# Patient Record
Sex: FEMALE | NOT HISPANIC OR LATINO | Employment: FULL TIME | URBAN - METROPOLITAN AREA
[De-identification: names, ages, dates, MRNs, and addresses within clinical notes are randomized per-mention and may not be internally consistent; named-entity substitution may affect disease eponyms.]

---

## 2021-07-08 ENCOUNTER — HOSPITAL ENCOUNTER (EMERGENCY)
Facility: HOSPITAL | Age: 21
Discharge: HOME | End: 2021-07-09
Admitting: EMERGENCY MEDICINE

## 2021-07-08 DIAGNOSIS — O21.0 HYPEREMESIS OF PREGNANCY: Primary | ICD-10-CM

## 2021-07-08 DIAGNOSIS — Z3A.01 LESS THAN 8 WEEKS GESTATION OF PREGNANCY: ICD-10-CM

## 2021-07-08 LAB
ALBUMIN SERPL-MCNC: 4.2 G/DL (ref 3.5–5.2)
ALBUMIN/GLOB SERPL: 1.8 G/DL
ALP SERPL-CCNC: 56 U/L (ref 39–117)
ALT SERPL W P-5'-P-CCNC: 11 U/L (ref 1–33)
ANION GAP SERPL CALCULATED.3IONS-SCNC: 9.8 MMOL/L (ref 5–15)
AST SERPL-CCNC: 11 U/L (ref 1–32)
BASOPHILS # BLD AUTO: 0.04 10*3/MM3 (ref 0–0.2)
BASOPHILS NFR BLD AUTO: 0.3 % (ref 0–1.5)
BILIRUB SERPL-MCNC: 0.2 MG/DL (ref 0–1.2)
BILIRUB UR QL STRIP: NEGATIVE
BUN SERPL-MCNC: 11 MG/DL (ref 6–20)
BUN/CREAT SERPL: 11.1 (ref 7–25)
CALCIUM SPEC-SCNC: 9.1 MG/DL (ref 8.6–10.5)
CHLORIDE SERPL-SCNC: 105 MMOL/L (ref 98–107)
CLARITY UR: CLEAR
CO2 SERPL-SCNC: 23.2 MMOL/L (ref 22–29)
COLOR UR: YELLOW
CREAT SERPL-MCNC: 0.99 MG/DL (ref 0.57–1)
DEPRECATED RDW RBC AUTO: 39.8 FL (ref 37–54)
EOSINOPHIL # BLD AUTO: 0.26 10*3/MM3 (ref 0–0.4)
EOSINOPHIL NFR BLD AUTO: 2 % (ref 0.3–6.2)
ERYTHROCYTE [DISTWIDTH] IN BLOOD BY AUTOMATED COUNT: 11.9 % (ref 12.3–15.4)
GFR SERPL CREATININE-BSD FRML MDRD: 70 ML/MIN/1.73
GFR SERPL CREATININE-BSD FRML MDRD: 85 ML/MIN/1.73
GLOBULIN UR ELPH-MCNC: 2.3 GM/DL
GLUCOSE SERPL-MCNC: 86 MG/DL (ref 65–99)
GLUCOSE UR STRIP-MCNC: NEGATIVE MG/DL
HCG INTACT+B SERPL-ACNC: 306.4 MIU/ML
HCT VFR BLD AUTO: 43.2 % (ref 34–46.6)
HGB BLD-MCNC: 14.9 G/DL (ref 12–15.9)
HGB UR QL STRIP.AUTO: NEGATIVE
HOLD SPECIMEN: NORMAL
HOLD SPECIMEN: NORMAL
IMM GRANULOCYTES # BLD AUTO: 0.05 10*3/MM3 (ref 0–0.05)
IMM GRANULOCYTES NFR BLD AUTO: 0.4 % (ref 0–0.5)
KETONES UR QL STRIP: NEGATIVE
LEUKOCYTE ESTERASE UR QL STRIP.AUTO: NEGATIVE
LIPASE SERPL-CCNC: 42 U/L (ref 13–60)
LYMPHOCYTES # BLD AUTO: 4.14 10*3/MM3 (ref 0.7–3.1)
LYMPHOCYTES NFR BLD AUTO: 31.7 % (ref 19.6–45.3)
MCH RBC QN AUTO: 31.4 PG (ref 26.6–33)
MCHC RBC AUTO-ENTMCNC: 34.5 G/DL (ref 31.5–35.7)
MCV RBC AUTO: 90.9 FL (ref 79–97)
MONOCYTES # BLD AUTO: 0.97 10*3/MM3 (ref 0.1–0.9)
MONOCYTES NFR BLD AUTO: 7.4 % (ref 5–12)
NEUTROPHILS NFR BLD AUTO: 58.2 % (ref 42.7–76)
NEUTROPHILS NFR BLD AUTO: 7.58 10*3/MM3 (ref 1.7–7)
NITRITE UR QL STRIP: NEGATIVE
NRBC BLD AUTO-RTO: 0 /100 WBC (ref 0–0.2)
PH UR STRIP.AUTO: 6.5 [PH] (ref 5–8)
PLATELET # BLD AUTO: 257 10*3/MM3 (ref 140–450)
PMV BLD AUTO: 8.9 FL (ref 6–12)
POTASSIUM SERPL-SCNC: 3.9 MMOL/L (ref 3.5–5.2)
PROT SERPL-MCNC: 6.5 G/DL (ref 6–8.5)
PROT UR QL STRIP: NEGATIVE
RBC # BLD AUTO: 4.75 10*6/MM3 (ref 3.77–5.28)
SODIUM SERPL-SCNC: 138 MMOL/L (ref 136–145)
SP GR UR STRIP: <=1.005 (ref 1–1.03)
UROBILINOGEN UR QL STRIP: NORMAL
WBC # BLD AUTO: 13.04 10*3/MM3 (ref 3.4–10.8)
WHOLE BLOOD HOLD SPECIMEN: NORMAL

## 2021-07-08 PROCEDURE — 81003 URINALYSIS AUTO W/O SCOPE: CPT

## 2021-07-08 PROCEDURE — 83690 ASSAY OF LIPASE: CPT

## 2021-07-08 PROCEDURE — 99283 EMERGENCY DEPT VISIT LOW MDM: CPT

## 2021-07-08 PROCEDURE — 84702 CHORIONIC GONADOTROPIN TEST: CPT

## 2021-07-08 PROCEDURE — 85025 COMPLETE CBC W/AUTO DIFF WBC: CPT

## 2021-07-08 PROCEDURE — 36415 COLL VENOUS BLD VENIPUNCTURE: CPT

## 2021-07-08 PROCEDURE — 80053 COMPREHEN METABOLIC PANEL: CPT

## 2021-07-08 RX ORDER — SODIUM CHLORIDE 0.9 % (FLUSH) 0.9 %
10 SYRINGE (ML) INJECTION AS NEEDED
Status: DISCONTINUED | OUTPATIENT
Start: 2021-07-08 | End: 2021-07-09 | Stop reason: HOSPADM

## 2021-07-09 VITALS
DIASTOLIC BLOOD PRESSURE: 60 MMHG | TEMPERATURE: 98 F | WEIGHT: 172.62 LBS | HEART RATE: 63 BPM | SYSTOLIC BLOOD PRESSURE: 120 MMHG | RESPIRATION RATE: 16 BRPM | BODY MASS INDEX: 27.09 KG/M2 | HEIGHT: 67 IN | OXYGEN SATURATION: 100 %

## 2021-07-09 PROCEDURE — 25010000002 ONDANSETRON PER 1 MG: Performed by: NURSE PRACTITIONER

## 2021-07-09 PROCEDURE — 96374 THER/PROPH/DIAG INJ IV PUSH: CPT

## 2021-07-09 RX ORDER — ONDANSETRON 2 MG/ML
4 INJECTION INTRAMUSCULAR; INTRAVENOUS ONCE
Status: COMPLETED | OUTPATIENT
Start: 2021-07-09 | End: 2021-07-09

## 2021-07-09 RX ADMIN — SODIUM CHLORIDE 1000 ML: 9 INJECTION, SOLUTION INTRAVENOUS at 00:10

## 2021-07-09 RX ADMIN — ONDANSETRON 4 MG: 2 INJECTION INTRAMUSCULAR; INTRAVENOUS at 00:11

## 2021-07-09 NOTE — ED PROVIDER NOTES
Subjective   The patient presents to the emergency department stating that she has had nausea and vomiting for 4 days.  She states that she found out 2 days ago with a home pregnancy test that she was pregnant.  She states that she has had no significant abdominal pain or cramping.  She denies any vaginal discharge or bleeding.  She states that for the past 2 days she has had intermittent nausea and vomiting throughout the entire day.  She has no pain or tenderness with palpation.  She has no rebound or guarding.  The patient reports that she has had no urinary symptoms.  She denies any recent fevers.  She states she is a  1 para 0.  She denies any recent abdominal trauma or falls.          Review of Systems   Constitutional: Negative for chills and fever.   HENT: Negative for congestion, ear pain and sore throat.    Eyes: Negative for pain.   Respiratory: Negative for cough, chest tightness and shortness of breath.    Cardiovascular: Negative for chest pain.   Gastrointestinal: Positive for nausea and vomiting. Negative for abdominal pain and diarrhea.   Genitourinary: Negative for flank pain and hematuria.   Musculoskeletal: Negative for joint swelling.   Skin: Negative for pallor.   Neurological: Negative for seizures and headaches.   All other systems reviewed and are negative.      Past Medical History:   Diagnosis Date   • Diabetes mellitus (CMS/HCC)    • Hypertension        No Known Allergies    Past Surgical History:   Procedure Laterality Date   • WISDOM TOOTH EXTRACTION         Family History   Problem Relation Age of Onset   • Heart failure Father        Social History     Socioeconomic History   • Marital status: Single     Spouse name: Not on file   • Number of children: Not on file   • Years of education: Not on file   • Highest education level: Not on file   Tobacco Use   • Smoking status: Current Every Day Smoker     Packs/day: 1.00     Years: 7.00     Pack years: 7.00     Types: Cigarettes    • Smokeless tobacco: Never Used   Vaping Use   • Vaping Use: Never used   Substance and Sexual Activity   • Alcohol use: Not Currently   • Drug use: Yes     Frequency: 14.0 times per week     Types: Marijuana     Comment: PT REPORTS SMOKING TWICE DAILY           Objective   Physical Exam  Vitals and nursing note reviewed.   Constitutional:       General: She is not in acute distress.     Appearance: Normal appearance. She is not toxic-appearing.   HENT:      Head: Normocephalic and atraumatic.      Mouth/Throat:      Mouth: Mucous membranes are moist.   Cardiovascular:      Rate and Rhythm: Normal rate and regular rhythm.      Pulses: Normal pulses.   Pulmonary:      Effort: Pulmonary effort is normal. No respiratory distress.   Abdominal:      General: Abdomen is flat.      Palpations: Abdomen is soft.      Tenderness: There is no abdominal tenderness.   Musculoskeletal:         General: Normal range of motion.      Cervical back: Normal range of motion and neck supple.   Skin:     General: Skin is warm and dry.   Neurological:      Mental Status: She is alert and oriented to person, place, and time. Mental status is at baseline.         Procedures           ED Course  ED Course as of Jul 09 0139 Fri Jul 09, 2021   0002 The patient reports that she is feeling better and her nausea has resolved since her Zofran and fluids.    [TC]      ED Course User Index  [TC] Citlali Machado APRN                                           MDM  Number of Diagnoses or Management Options  Hyperemesis of pregnancy: minor  Less than 8 weeks gestation of pregnancy: minor     Amount and/or Complexity of Data Reviewed  Clinical lab tests: reviewed    Risk of Complications, Morbidity, and/or Mortality  Presenting problems: low  Diagnostic procedures: low  Management options: low    Patient Progress  Patient progress: stable      Final diagnoses:   Hyperemesis of pregnancy   Less than 8 weeks gestation of pregnancy       ED  Disposition  ED Disposition     ED Disposition Condition Comment    Discharge Stable           Hussein Jose MD  [address] [facility] [address]  [phone]      As needed    YOUR OB AT [facility]  AS SCHEDULED             Medication List      No changes were made to your prescriptions during this visit.          Citlali Machado, APRN  07/09/21 0140

## 2021-07-11 RX ORDER — ONDANSETRON 4 MG/1
4 TABLET, ORALLY DISINTEGRATING ORAL EVERY 8 HOURS PRN
Qty: 20 TABLET | Refills: 0 | Status: SHIPPED | OUTPATIENT
Start: 2021-07-11 | End: 2021-11-30 | Stop reason: SDUPTHER

## 2021-07-11 NOTE — PROGRESS NOTES
Discharge LPN called department, advised that on routine follow up call pt states the provider said she was going to prescribe Zofran for her but it is not at the pharmacy. Chart reviewed, RX for Zofran sent to pt pharmacy.

## 2021-07-15 ENCOUNTER — HOSPITAL ENCOUNTER (EMERGENCY)
Facility: HOSPITAL | Age: 21
Discharge: HOME | End: 2021-07-15
Attending: EMERGENCY MEDICINE | Admitting: EMERGENCY MEDICINE

## 2021-07-15 VITALS
BODY MASS INDEX: 26.96 KG/M2 | WEIGHT: 171.74 LBS | HEIGHT: 67 IN | HEART RATE: 81 BPM | RESPIRATION RATE: 18 BRPM | DIASTOLIC BLOOD PRESSURE: 81 MMHG | OXYGEN SATURATION: 91 % | SYSTOLIC BLOOD PRESSURE: 138 MMHG | TEMPERATURE: 99.1 F

## 2021-07-15 DIAGNOSIS — Z3A.01 LESS THAN 8 WEEKS GESTATION OF PREGNANCY: Primary | ICD-10-CM

## 2021-07-15 DIAGNOSIS — R55 SYNCOPE, UNSPECIFIED SYNCOPE TYPE: ICD-10-CM

## 2021-07-15 LAB
BASOPHILS # BLD AUTO: 0.03 10*3/MM3 (ref 0–0.2)
BASOPHILS NFR BLD AUTO: 0.3 % (ref 0–1.5)
BILIRUB UR QL STRIP: NEGATIVE
CLARITY UR: CLEAR
COLOR UR: YELLOW
DEPRECATED RDW RBC AUTO: 38.9 FL (ref 37–54)
EOSINOPHIL # BLD AUTO: 0.12 10*3/MM3 (ref 0–0.4)
EOSINOPHIL NFR BLD AUTO: 1 % (ref 0.3–6.2)
ERYTHROCYTE [DISTWIDTH] IN BLOOD BY AUTOMATED COUNT: 11.9 % (ref 12.3–15.4)
GLUCOSE UR STRIP-MCNC: NEGATIVE MG/DL
HCG INTACT+B SERPL-ACNC: 4737 MIU/ML
HCT VFR BLD AUTO: 42.6 % (ref 34–46.6)
HGB BLD-MCNC: 14.9 G/DL (ref 12–15.9)
HGB UR QL STRIP.AUTO: NEGATIVE
HOLD SPECIMEN: NORMAL
HOLD SPECIMEN: NORMAL
IMM GRANULOCYTES # BLD AUTO: 0.05 10*3/MM3 (ref 0–0.05)
IMM GRANULOCYTES NFR BLD AUTO: 0.4 % (ref 0–0.5)
KETONES UR QL STRIP: NEGATIVE
LEUKOCYTE ESTERASE UR QL STRIP.AUTO: NEGATIVE
LIPASE SERPL-CCNC: 50 U/L (ref 13–60)
LYMPHOCYTES # BLD AUTO: 3.39 10*3/MM3 (ref 0.7–3.1)
LYMPHOCYTES NFR BLD AUTO: 28.9 % (ref 19.6–45.3)
MCH RBC QN AUTO: 31.4 PG (ref 26.6–33)
MCHC RBC AUTO-ENTMCNC: 35 G/DL (ref 31.5–35.7)
MCV RBC AUTO: 89.7 FL (ref 79–97)
MONOCYTES # BLD AUTO: 0.85 10*3/MM3 (ref 0.1–0.9)
MONOCYTES NFR BLD AUTO: 7.3 % (ref 5–12)
NEUTROPHILS NFR BLD AUTO: 62.1 % (ref 42.7–76)
NEUTROPHILS NFR BLD AUTO: 7.27 10*3/MM3 (ref 1.7–7)
NITRITE UR QL STRIP: NEGATIVE
NRBC BLD AUTO-RTO: 0 /100 WBC (ref 0–0.2)
PH UR STRIP.AUTO: 6.5 [PH] (ref 5–8)
PLATELET # BLD AUTO: 256 10*3/MM3 (ref 140–450)
PMV BLD AUTO: 8.9 FL (ref 6–12)
PROT UR QL STRIP: NEGATIVE
RBC # BLD AUTO: 4.75 10*6/MM3 (ref 3.77–5.28)
SP GR UR STRIP: <=1.005 (ref 1–1.03)
UROBILINOGEN UR QL STRIP: NORMAL
WBC # BLD AUTO: 11.71 10*3/MM3 (ref 3.4–10.8)
WHOLE BLOOD HOLD SPECIMEN: NORMAL

## 2021-07-15 PROCEDURE — 81003 URINALYSIS AUTO W/O SCOPE: CPT

## 2021-07-15 PROCEDURE — 85025 COMPLETE CBC W/AUTO DIFF WBC: CPT

## 2021-07-15 PROCEDURE — 93005 ELECTROCARDIOGRAM TRACING: CPT

## 2021-07-15 PROCEDURE — 83690 ASSAY OF LIPASE: CPT

## 2021-07-15 PROCEDURE — 96374 THER/PROPH/DIAG INJ IV PUSH: CPT

## 2021-07-15 PROCEDURE — 84702 CHORIONIC GONADOTROPIN TEST: CPT

## 2021-07-15 PROCEDURE — 93010 ELECTROCARDIOGRAM REPORT: CPT | Performed by: INTERNAL MEDICINE

## 2021-07-15 PROCEDURE — 93005 ELECTROCARDIOGRAM TRACING: CPT | Performed by: EMERGENCY MEDICINE

## 2021-07-15 PROCEDURE — 25010000002 ONDANSETRON PER 1 MG: Performed by: EMERGENCY MEDICINE

## 2021-07-15 PROCEDURE — 99284 EMERGENCY DEPT VISIT MOD MDM: CPT

## 2021-07-15 RX ORDER — MULTIPLE VITAMINS W/ MINERALS TAB 9MG-400MCG
1 TAB ORAL DAILY
COMMUNITY

## 2021-07-15 RX ORDER — SODIUM CHLORIDE 0.9 % (FLUSH) 0.9 %
10 SYRINGE (ML) INJECTION AS NEEDED
Status: DISCONTINUED | OUTPATIENT
Start: 2021-07-15 | End: 2021-07-16 | Stop reason: HOSPADM

## 2021-07-15 RX ORDER — PRENATAL VIT NO.126/IRON/FOLIC 28MG-0.8MG
TABLET ORAL DAILY
COMMUNITY

## 2021-07-15 RX ORDER — ONDANSETRON 2 MG/ML
4 INJECTION INTRAMUSCULAR; INTRAVENOUS ONCE
Status: COMPLETED | OUTPATIENT
Start: 2021-07-15 | End: 2021-07-15

## 2021-07-15 RX ADMIN — SODIUM CHLORIDE 1000 ML: 9 INJECTION, SOLUTION INTRAVENOUS at 21:24

## 2021-07-15 RX ADMIN — ONDANSETRON 4 MG: 2 INJECTION INTRAMUSCULAR; INTRAVENOUS at 21:24

## 2021-07-16 LAB — QT INTERVAL: 395 MS

## 2021-07-16 NOTE — ED PROVIDER NOTES
Time: 9:03 PM EDT  Arrived by: Car  Chief Complaint:   Chief Complaint   Patient presents with   • Pregnancy Problem     5 weeks   • Syncope     x mins, woke up on the floor at work     History provided by: Patient  History is limited by: N/A     History of Present Illness:  Patient is a 20 y.o. female that presents to the emergency department after a syncope episode that occurred today at work. She states she felt weak and lightheaded prior to the episode. Patient notes she is 5 weeks pregnant.         History provided by:  Patient  Syncope  Episode history:  Single  Most recent episode:  Today  Timing:  Constant  Progression:  Resolved  Chronicity:  New  Associated symptoms: dizziness, nausea and weakness          Patient Care Team  Primary Care Provider: Hussein Jose MD    Past Medical History:     No Known Allergies  Past Medical History:   Diagnosis Date   • Anxiety    • Bipolar 1 disorder (CMS/Formerly [facility])    • Depression    • Diabetes mellitus (CMS/[facility])    • Hypertension    • Renal failure    • Schizophrenia (CMS/Formerly [facility])      Past Surgical History:   Procedure Laterality Date   • WISDOM TOOTH EXTRACTION     • WISDOM TOOTH EXTRACTION       Family History   Problem Relation Age of Onset   • Heart failure Father        Home Medications:  Prior to Admission medications    Medication Sig Start Date End Date Taking? Authorizing Provider   multivitamin with minerals (MULTIVITAMIN ADULT PO) Take 1 tablet by mouth Daily.   Yes ProviderTamica MD   prenatal vitamin (prenatal, CLASSIC, vitamin) tablet Take  by mouth Daily.   Yes ProviderTamica MD   ondansetron ODT (ZOFRAN-ODT) 4 MG disintegrating tablet Place 1 tablet on the tongue Every 8 (Eight) Hours As Needed for Nausea or Vomiting. 7/11/21   Mai Rico APRN   risperiDONE (risperDAL) 1 MG tablet Take 1 mg by mouth 2 (Two) Times a Day.    Emergency, Nurse Erick, RN   sertraline (ZOLOFT) 50 MG tablet Take 50 mg by mouth Daily.    Emergency, Nurse  "Erick, RN   vitamin B-6 (PYRIDOXINE) 25 MG tablet Take 1 tablet by mouth 3 (Three) Times a Day As Needed (pregnancy related nausea/vomiting). 7/7/21   Poppy Kennedy MD        Social History:   Social History     Tobacco Use   • Smoking status: Current Every Day Smoker     Packs/day: 1.00     Years: 7.00     Pack years: 7.00     Types: Cigarettes   • Smokeless tobacco: Never Used   Vaping Use   • Vaping Use: Never used   Substance Use Topics   • Alcohol use: Never   • Drug use: Yes     Frequency: 14.0 times per week     Types: Marijuana     Comment: PT REPORTS SMOKING TWICE DAILY     Review of Systems:  Review of Systems   Constitutional: Negative for chills.   HENT: Negative for congestion, rhinorrhea and sore throat.    Eyes: Negative for pain and visual disturbance.   Respiratory: Negative for apnea, cough and chest tightness.    Cardiovascular: Positive for syncope.   Gastrointestinal: Positive for nausea. Negative for abdominal pain and diarrhea.   Genitourinary: Negative for difficulty urinating and dysuria.   Musculoskeletal: Negative for joint swelling and myalgias.   Skin: Negative for color change.   Neurological: Positive for dizziness and weakness.   Psychiatric/Behavioral: Negative.    All other systems reviewed and are negative.       Physical Exam:  /81   Pulse 81   Temp 99.1 °F (37.3 °C) (Oral)   Resp 18   Ht 170.2 cm (67\")   Wt 77.9 kg (171 lb 11.8 oz)   LMP 06/07/2021 (Exact Date)   SpO2 91%   BMI 26.90 kg/m²     Physical Exam  Vitals and nursing note reviewed.   Constitutional:       General: She is not in acute distress.     Appearance: Normal appearance. She is not toxic-appearing.   HENT:      Head: Normocephalic and atraumatic.      Jaw: There is normal jaw occlusion.   Eyes:      General: Lids are normal.      Extraocular Movements: Extraocular movements intact.      Conjunctiva/sclera: Conjunctivae normal.      Pupils: Pupils are equal, round, and reactive to light. "   Cardiovascular:      Rate and Rhythm: Normal rate and regular rhythm.      Pulses: Normal pulses.      Heart sounds: Normal heart sounds.   Pulmonary:      Effort: Pulmonary effort is normal. No respiratory distress.      Breath sounds: Normal breath sounds. No wheezing or rhonchi.   Abdominal:      General: Abdomen is flat.      Palpations: Abdomen is soft.      Tenderness: There is no abdominal tenderness. There is no guarding or rebound.   Musculoskeletal:         General: Normal range of motion.      Cervical back: Normal range of motion and neck supple.      Right lower leg: No edema.      Left lower leg: No edema.   Skin:     General: Skin is warm and dry.   Neurological:      Mental Status: She is alert and oriented to person, place, and time. Mental status is at baseline.   Psychiatric:         Mood and Affect: Mood normal.                Medications in the Emergency Department:  Medications   sodium chloride 0.9 % bolus 1,000 mL (0 mL Intravenous Stopped 7/15/21 2204)   ondansetron (ZOFRAN) injection 4 mg (4 mg Intravenous Given 7/15/21 2124)        Labs  Lab Results (last 24 hours)     ** No results found for the last 24 hours. **           Imaging:  No Radiology Exams Resulted Within Past 24 Hours    Procedures:  Procedures    Progress                            Medical Decision Making:  MDM    20-year-old female patient presented after a syncopal episode at work. There was no injury. Patient is approximately five weeks pregnant. The patient's workup is negative for any significant acute findings. Patient was given IV fluids. The patient said she feels much better in the stable for discharge.    Final diagnoses:   Less than 8 weeks gestation of pregnancy   Syncope, unspecified syncope type        Disposition:  ED Disposition     ED Disposition Condition Comment    Discharge Stable           Documentation assistance provided by Mya Hutton acting as scribe for Chad Fitzgerald DO. Information recorded by  the scribe was done at my direction and has been verified and validated by me.        Mya Hutton  07/15/21 9975       Chad Fitzgerald DO  07/16/21 4039

## 2021-11-13 ENCOUNTER — HOSPITAL ENCOUNTER (OUTPATIENT)
Facility: HOSPITAL | Age: 21
Discharge: HOME | End: 2021-11-13
Attending: OBSTETRICS & GYNECOLOGY | Admitting: OBSTETRICS & GYNECOLOGY

## 2021-11-13 ENCOUNTER — HOSPITAL ENCOUNTER (EMERGENCY)
Facility: HOSPITAL | Age: 21
Discharge: STILL ADMITTED OR ADMINISTRATIVE | End: 2021-11-13

## 2021-11-13 ENCOUNTER — HOSPITAL ENCOUNTER (OUTPATIENT)
Facility: HOSPITAL | Age: 21
End: 2021-11-13
Attending: OBSTETRICS & GYNECOLOGY | Admitting: OBSTETRICS & GYNECOLOGY

## 2021-11-13 VITALS — BODY MASS INDEX: 28.56 KG/M2 | HEIGHT: 67 IN | WEIGHT: 182 LBS

## 2021-11-13 LAB
BILIRUB BLD-MCNC: NEGATIVE MG/DL
CLARITY, POC: CLEAR
COLOR UR: YELLOW
KETONES UR QL: NEGATIVE
LEUKOCYTE EST, POC: NEGATIVE
NITRITE UR-MCNC: NEGATIVE MG/ML
PH UR: 6 [PH] (ref 5–8)
PROT UR STRIP-MCNC: NEGATIVE MG/DL
RBC # UR STRIP: ABNORMAL /UL
SP GR UR: 1.01 (ref 1–1.03)
UROBILINOGEN UR QL: NORMAL

## 2021-11-13 PROCEDURE — 59025 FETAL NON-STRESS TEST: CPT | Performed by: OBSTETRICS & GYNECOLOGY

## 2021-11-13 PROCEDURE — 81002 URINALYSIS NONAUTO W/O SCOPE: CPT | Performed by: OBSTETRICS & GYNECOLOGY

## 2021-11-13 PROCEDURE — G0463 HOSPITAL OUTPT CLINIC VISIT: HCPCS

## 2021-11-13 RX ORDER — ASPIRIN 81 MG/1
81 TABLET, CHEWABLE ORAL DAILY
COMMUNITY

## 2021-11-13 RX ORDER — INSULIN GLARGINE 100 [IU]/ML
30 INJECTION, SOLUTION SUBCUTANEOUS NIGHTLY
COMMUNITY
End: 2021-11-30 | Stop reason: SDUPTHER

## 2021-11-14 NOTE — NON STRESS TEST
OB NST TRIAGE NOTE        Name:  Drea Lindo  MRN: [id number]    21 y.o. female  at 22w5d    Chief Complaint: LBP and cramping    Subjective: Obtains care at [facility].  Above complaints.  No UTI sxs.  No recent sex.  Has GDM- taking insulin and BS well controlled.  Last BS 97 2hr PP.    ROS: No leaking fluid, No vaginal bleeding and Adequate FM    Objective:    Baseline: Normal 110-160 bpm  Variability:   Moderate/Normal (amplitude 6-25 bpm) and cw GA  Accelerations: NA for GA  Decelerations: variable cw GA  Contractions:  Not jose  Duration:  see nursing documentation    Cervical exam:     Dilation: Closed     Effacement: Not effaced     Station: OOP/Ballotable    Brief Urine Lab Results  (Last result in the past 365 days)      Color   Clarity   Blood   Leuk Est   Nitrite   Protein   CREAT   Urine HCG        21 1842 Yellow   Clear   Small   Negative   Negative   Negative                 Impression:  22+ fetal testing reassurring for GA, no evidence of PTL    Plan: OB Precautions, Keep office visit      Electronically signed by Ida Barrett DO, 21, 7:26 PM EST.    [facility]  [address]  [place] [address]  Dept: [phone]  Loc: [phone]

## 2021-11-14 NOTE — NURSING NOTE
Pt arrived to triage co cramping, and feeling weak and dizzy.  22w5d reports hx of schizophrenia, suicide attempts, hypertension, renal failure, and diabetes.  Hx type 2 diabetes as teen controlled with weight loss and diet but had to be started on Insulin during pregnancy.  Also reports smokes THC twice a day.  Currently seen by Gabe.  SVE closed thick and high.  Denies vag bleeding, leaking fluid or discharge.  No ctx noted on monitor.  See documentation of FHT.

## 2021-11-18 PROCEDURE — 87635 SARS-COV-2 COVID-19 AMP PRB: CPT | Performed by: PHYSICIAN ASSISTANT

## 2021-12-13 ENCOUNTER — APPOINTMENT (OUTPATIENT)
Dept: ULTRASOUND IMAGING | Facility: HOSPITAL | Age: 21
End: 2021-12-13

## 2021-12-13 ENCOUNTER — HOSPITAL ENCOUNTER (EMERGENCY)
Facility: HOSPITAL | Age: 21
Discharge: HOME | End: 2021-12-13
Attending: EMERGENCY MEDICINE | Admitting: EMERGENCY MEDICINE

## 2021-12-13 VITALS
SYSTOLIC BLOOD PRESSURE: 116 MMHG | HEART RATE: 85 BPM | OXYGEN SATURATION: 100 % | WEIGHT: 188.27 LBS | RESPIRATION RATE: 20 BRPM | BODY MASS INDEX: 29.55 KG/M2 | HEIGHT: 67 IN | DIASTOLIC BLOOD PRESSURE: 64 MMHG | TEMPERATURE: 98.2 F

## 2021-12-13 DIAGNOSIS — M62.830 BACK MUSCLE SPASM: Primary | ICD-10-CM

## 2021-12-13 LAB
ALBUMIN SERPL-MCNC: 3.6 G/DL (ref 3.5–5.2)
ALBUMIN/GLOB SERPL: 1.3 G/DL
ALP SERPL-CCNC: 71 U/L (ref 39–117)
ALT SERPL W P-5'-P-CCNC: 21 U/L (ref 1–33)
ANION GAP SERPL CALCULATED.3IONS-SCNC: 9.1 MMOL/L (ref 5–15)
AST SERPL-CCNC: 13 U/L (ref 1–32)
BACTERIA UR QL AUTO: ABNORMAL /HPF
BASOPHILS # BLD AUTO: 0.03 10*3/MM3 (ref 0–0.2)
BASOPHILS NFR BLD AUTO: 0.2 % (ref 0–1.5)
BILIRUB SERPL-MCNC: <0.2 MG/DL (ref 0–1.2)
BILIRUB UR QL STRIP: NEGATIVE
BUN SERPL-MCNC: 12 MG/DL (ref 6–20)
BUN/CREAT SERPL: 13.2 (ref 7–25)
CALCIUM SPEC-SCNC: 9 MG/DL (ref 8.6–10.5)
CHLORIDE SERPL-SCNC: 107 MMOL/L (ref 98–107)
CLARITY UR: CLEAR
CO2 SERPL-SCNC: 20.9 MMOL/L (ref 22–29)
COLOR UR: YELLOW
CREAT SERPL-MCNC: 0.91 MG/DL (ref 0.57–1)
DEPRECATED RDW RBC AUTO: 40.6 FL (ref 37–54)
EOSINOPHIL # BLD AUTO: 0.14 10*3/MM3 (ref 0–0.4)
EOSINOPHIL NFR BLD AUTO: 0.9 % (ref 0.3–6.2)
ERYTHROCYTE [DISTWIDTH] IN BLOOD BY AUTOMATED COUNT: 12.1 % (ref 12.3–15.4)
GFR SERPL CREATININE-BSD FRML MDRD: 77 ML/MIN/1.73
GFR SERPL CREATININE-BSD FRML MDRD: 93 ML/MIN/1.73
GLOBULIN UR ELPH-MCNC: 2.7 GM/DL
GLUCOSE SERPL-MCNC: 67 MG/DL (ref 65–99)
GLUCOSE UR STRIP-MCNC: NEGATIVE MG/DL
HCT VFR BLD AUTO: 37.8 % (ref 34–46.6)
HGB BLD-MCNC: 13.2 G/DL (ref 12–15.9)
HGB UR QL STRIP.AUTO: ABNORMAL
HYALINE CASTS UR QL AUTO: ABNORMAL /LPF
IMM GRANULOCYTES # BLD AUTO: 0.1 10*3/MM3 (ref 0–0.05)
IMM GRANULOCYTES NFR BLD AUTO: 0.6 % (ref 0–0.5)
KETONES UR QL STRIP: NEGATIVE
LEUKOCYTE ESTERASE UR QL STRIP.AUTO: NEGATIVE
LYMPHOCYTES # BLD AUTO: 2.52 10*3/MM3 (ref 0.7–3.1)
LYMPHOCYTES NFR BLD AUTO: 16.3 % (ref 19.6–45.3)
MCH RBC QN AUTO: 32.1 PG (ref 26.6–33)
MCHC RBC AUTO-ENTMCNC: 34.9 G/DL (ref 31.5–35.7)
MCV RBC AUTO: 92 FL (ref 79–97)
MONOCYTES # BLD AUTO: 0.85 10*3/MM3 (ref 0.1–0.9)
MONOCYTES NFR BLD AUTO: 5.5 % (ref 5–12)
NEUTROPHILS NFR BLD AUTO: 11.81 10*3/MM3 (ref 1.7–7)
NEUTROPHILS NFR BLD AUTO: 76.5 % (ref 42.7–76)
NITRITE UR QL STRIP: NEGATIVE
NRBC BLD AUTO-RTO: 0 /100 WBC (ref 0–0.2)
PH UR STRIP.AUTO: 7 [PH] (ref 5–8)
PLATELET # BLD AUTO: 270 10*3/MM3 (ref 140–450)
PMV BLD AUTO: 9.1 FL (ref 6–12)
POTASSIUM SERPL-SCNC: 4 MMOL/L (ref 3.5–5.2)
PROT SERPL-MCNC: 6.3 G/DL (ref 6–8.5)
PROT UR QL STRIP: NEGATIVE
RBC # BLD AUTO: 4.11 10*6/MM3 (ref 3.77–5.28)
RBC # UR STRIP: ABNORMAL /HPF
REF LAB TEST METHOD: ABNORMAL
SODIUM SERPL-SCNC: 137 MMOL/L (ref 136–145)
SP GR UR STRIP: 1.01 (ref 1–1.03)
SQUAMOUS #/AREA URNS HPF: ABNORMAL /HPF
UROBILINOGEN UR QL STRIP: ABNORMAL
WBC # UR STRIP: ABNORMAL /HPF
WBC NRBC COR # BLD: 15.45 10*3/MM3 (ref 3.4–10.8)

## 2021-12-13 PROCEDURE — 99283 EMERGENCY DEPT VISIT LOW MDM: CPT

## 2021-12-13 PROCEDURE — 85025 COMPLETE CBC W/AUTO DIFF WBC: CPT | Performed by: REGISTERED NURSE

## 2021-12-13 PROCEDURE — 81001 URINALYSIS AUTO W/SCOPE: CPT | Performed by: PHYSICIAN ASSISTANT

## 2021-12-13 PROCEDURE — 36415 COLL VENOUS BLD VENIPUNCTURE: CPT

## 2021-12-13 PROCEDURE — 76775 US EXAM ABDO BACK WALL LIM: CPT

## 2021-12-13 PROCEDURE — 80053 COMPREHEN METABOLIC PANEL: CPT | Performed by: REGISTERED NURSE

## 2021-12-13 RX ORDER — CYCLOBENZAPRINE HCL 10 MG
10 TABLET ORAL 3 TIMES DAILY PRN
Qty: 6 TABLET | Refills: 0 | Status: SHIPPED | OUTPATIENT
Start: 2021-12-13 | End: 2021-12-15

## 2021-12-13 RX ORDER — CYCLOBENZAPRINE HCL 10 MG
10 TABLET ORAL ONCE
Status: COMPLETED | OUTPATIENT
Start: 2021-12-13 | End: 2021-12-13

## 2021-12-13 RX ADMIN — CYCLOBENZAPRINE 10 MG: 10 TABLET, FILM COATED ORAL at 17:03

## 2021-12-13 NOTE — ED PROVIDER NOTES
Time: 4:02 PM EST  Arrived by: THUY  Chief Complaint: Back pain  History provided by: Patient    History of Present Illness:  Patient is a 21 y.o. year old female that presents to the emergency department with complaint of right-sided back pain that started about 1 week ago.  At suddenly became worse 2 days ago and has not been relief with Tylenol.  Patient states it is worsened with any kind of movement.  She reports being sick last week and coughing a lot and feels like her pain is caused by this.  She denies fever/chills, nausea/vomiting, vaginal discharge or bleeding, diarrhea or constipation.  She reports normal bowel movement today.    [facility], gestational diabetes, hx of kidney failure (last appt 3wks ago)    Dr Mcrae, maternal-fetal medicine for diabetes management (appt tomorrow)       used: No    Abdominal Pain  Pain location:  Suprapubic and RLQ  Pain quality: aching    Pain radiates to:  Does not radiate  Pain severity:  Mild  Onset quality:  Sudden  Timing:  Intermittent  Chronicity:  New  Relieved by:  Nothing  Worsened by:  Nothing  Ineffective treatments:  None tried  Associated symptoms: no chest pain, no chills, no constipation, no cough, no diarrhea, no dysuria, no fever, no hematuria, no melena, no nausea, no shortness of breath, no sore throat and no vomiting    Flank Pain  Pain location:  R flank  Pain quality: throbbing    Pain radiates to:  RLQ  Pain severity:  Mild  Onset quality:  Sudden  Duration:  1 week  Timing:  Constant  Progression:  Worsening  Chronicity:  New  Context comment:  Coughing  Relieved by:  Nothing  Worsened by:  Movement  Ineffective treatments:  OTC medications  Associated symptoms: no chest pain, no chills, no constipation, no cough, no diarrhea, no dysuria, no fever, no hematuria, no melena, no nausea, no shortness of breath, no sore throat and no vomiting            Similar Symptoms Previously: No  Recently seen: No      Patient  Care Team  Primary Care Provider: None listed    Past Medical History:     No Known Allergies  Past Medical History:   Diagnosis Date   • Anxiety    • Bipolar 1 disorder (HCC)    • Depression    • Diabetes mellitus (HCC)    • Hypertension    • Nephrocalcinosis    • Renal failure    • Schizophrenia (HCC)      Past Surgical History:   Procedure Laterality Date   • DENTAL PROCEDURE     • WISDOM TOOTH EXTRACTION     • WISDOM TOOTH EXTRACTION       Family History   Problem Relation Age of Onset   • Heart failure Father        Home Medications:  Prior to Admission medications    Medication Sig Start Date End Date Taking? Authorizing Provider   aspirin 81 MG chewable tablet Chew 81 mg Daily.    ProviderTamica MD   budesonide (Rhinocort Allergy) 32 MCG/ACT nasal spray 1 spray into the nostril(s) as directed by provider Daily. 11/18/21   Gema Kulkarni PA   cetirizine (zyrTEC) 10 MG tablet Take 1 tablet by mouth Daily. 11/18/21   Gema Kulkarni PA   doxylamine (UNISOM) 25 MG tablet Take 25 mg by mouth At Night As Needed for Sleep.    ProviderTamica MD   glucagon (GLUCAGEN) 1 MG injection Inject 1 mg under the skin into the appropriate area as directed. 10/12/21 10/12/22  Emergency, Nurse Erick, RN   glucose blood (OneTouch Verio) test strip Use 6 times daily - please dispense brand covered. 10/4/21 10/4/22  Emergency, Nurse Erick, RN   insulin aspart (novoLOG) 100 UNIT/ML injection Inject  under the skin into the appropriate area as directed 3 (Three) Times a Day Before Meals. 1 unit per 15 carbs    ProviderTamica MD   Insulin Glargine (Lantus SoloStar) 100 UNIT/ML injection pen 36 units q  pm  As directed. 11/29/21 12/29/22  Emergency, Nurse Epic, RN   Insulin Lispro, 1 Unit Dial, (HUMALOG) 100 UNIT/ML solution pen-injector 1 Unit  Per 15 grams of carbs at breakfast & lunch, 1:10 carb ratio dinner. Max 50 units per day. 11/29/21 12/29/22  Emergency, Nurse Erick, RN   multivitamin with minerals  "(MULTIVITAMIN ADULT PO) Take 1 tablet by mouth Daily.    Provider, Historical, MD   ondansetron ODT (ZOFRAN-ODT) 4 MG disintegrating tablet ondansetron 4 mg disintegrating tablet    Emergency, Nurse Epic, RN   prenatal vitamin (prenatal, CLASSIC, vitamin) tablet Take  by mouth Daily.    Provider, Tamica, MD   pyridoxine (VITAMIN B-6) 25 MG tablet Take 25 mg by mouth. 7/26/21   Emergency, Nurse Erick RN   risperiDONE (risperDAL) 1 MG tablet Take 1 mg by mouth Daily.    Emergency, Nurse Erick RN   sertraline (ZOLOFT) 100 MG tablet  10/8/21   Emergency, Nurse Erick RN        Social History:   PT  reports that she has been smoking cigarettes. She has a 3.50 pack-year smoking history. She has never used smokeless tobacco. She reports current drug use. Frequency: 14.00 times per week. Drug: Marijuana. She reports that she does not drink alcohol.    Record Review:  I have reviewed the patient's records in [facility].     Review of Systems  Review of Systems   Constitutional: Negative for chills and fever.   HENT: Negative for congestion, ear pain and sore throat.    Eyes: Negative for pain.   Respiratory: Negative for cough, chest tightness and shortness of breath.    Cardiovascular: Negative for chest pain.   Gastrointestinal: Positive for abdominal pain. Negative for constipation, diarrhea, melena, nausea and vomiting.   Genitourinary: Positive for flank pain. Negative for dysuria and hematuria.   Musculoskeletal: Positive for back pain. Negative for joint swelling.   Skin: Negative for pallor.   Neurological: Negative for seizures and headaches.   All other systems reviewed and are negative.       Physical Exam    /64   Pulse 85   Temp 98.2 °F (36.8 °C) (Oral)   Resp 20   Ht 170.2 cm (67\")   Wt 85.4 kg (188 lb 4.4 oz)   LMP 06/07/2021 (Exact Date)   SpO2 100%   BMI 29.49 kg/m²     Physical Exam  Vitals and nursing note reviewed.   Constitutional:       General: She is not in acute distress.     Appearance: " "Normal appearance. She is not toxic-appearing.   HENT:      Head: Normocephalic and atraumatic.      Mouth/Throat:      Mouth: Mucous membranes are moist.   Eyes:      General: No scleral icterus.  Cardiovascular:      Rate and Rhythm: Normal rate and regular rhythm.      Pulses: Normal pulses.      Heart sounds: Normal heart sounds.   Pulmonary:      Effort: Pulmonary effort is normal. No respiratory distress.      Breath sounds: Normal breath sounds.   Abdominal:      General: Abdomen is flat and protuberant. Bowel sounds are normal.      Palpations: Abdomen is soft.      Tenderness: There is no abdominal tenderness. There is no right CVA tenderness, left CVA tenderness, guarding or rebound.   Musculoskeletal:         General: Normal range of motion.      Cervical back: Normal range of motion and neck supple.      Lumbar back: Spasms and tenderness present.        Back:    Skin:     General: Skin is warm and dry.   Neurological:      Mental Status: She is alert and oriented to person, place, and time. Mental status is at baseline.                  ED Course  /64   Pulse 85   Temp 98.2 °F (36.8 °C) (Oral)   Resp 20   Ht 170.2 cm (67\")   Wt 85.4 kg (188 lb 4.4 oz)   LMP 06/07/2021 (Exact Date)   SpO2 100%   BMI 29.49 kg/m²   Results for orders placed or performed during the hospital encounter of 12/13/21   Urinalysis With Microscopic If Indicated (No Culture) - Urine, Clean Catch    Specimen: Urine, Clean Catch   Result Value Ref Range    Color, UA Yellow Yellow, Straw    Appearance, UA Clear Clear    pH, UA 7.0 5.0 - 8.0    Specific Gravity, UA 1.010 1.005 - 1.030    Glucose, UA Negative Negative    Ketones, UA Negative Negative    Bilirubin, UA Negative Negative    Blood, UA Trace (A) Negative    Protein, UA Negative Negative    Leuk Esterase, UA Negative Negative    Nitrite, UA Negative Negative    Urobilinogen, UA 0.2 E.U./dL 0.2 - 1.0 E.U./dL   Urinalysis, Microscopic Only - Urine, Clean Catch    " Specimen: Urine, Clean Catch   Result Value Ref Range    RBC, UA 3-5 (A) None Seen /HPF    WBC, UA 0-2 (A) None Seen /HPF    Bacteria, UA None Seen None Seen /HPF    Squamous Epithelial Cells, UA 0-2 None Seen, 0-2 /HPF    Hyaline Casts, UA 0-2 None Seen /LPF    Methodology Automated Microscopy    Comprehensive Metabolic Panel    Specimen: Blood   Result Value Ref Range    Glucose 67 65 - 99 mg/dL    BUN 12 6 - 20 mg/dL    Creatinine 0.91 0.57 - 1.00 mg/dL    Sodium 137 136 - 145 mmol/L    Potassium 4.0 3.5 - 5.2 mmol/L    Chloride 107 98 - 107 mmol/L    CO2 20.9 (L) 22.0 - 29.0 mmol/L    Calcium 9.0 8.6 - 10.5 mg/dL    Total Protein 6.3 6.0 - 8.5 g/dL    Albumin 3.60 3.50 - 5.20 g/dL    ALT (SGPT) 21 1 - 33 U/L    AST (SGOT) 13 1 - 32 U/L    Alkaline Phosphatase 71 39 - 117 U/L    Total Bilirubin <0.2 0.0 - 1.2 mg/dL    eGFR Non African Amer 77 >60 mL/min/1.73    eGFR  African Amer 93 >60 mL/min/1.73    Globulin 2.7 gm/dL    A/G Ratio 1.3 g/dL    BUN/Creatinine Ratio 13.2 7.0 - 25.0    Anion Gap 9.1 5.0 - 15.0 mmol/L   CBC Auto Differential    Specimen: Blood   Result Value Ref Range    WBC 15.45 (H) 3.40 - 10.80 10*3/mm3    RBC 4.11 3.77 - 5.28 10*6/mm3    Hemoglobin 13.2 12.0 - 15.9 g/dL    Hematocrit 37.8 34.0 - 46.6 %    MCV 92.0 79.0 - 97.0 fL    MCH 32.1 26.6 - 33.0 pg    MCHC 34.9 31.5 - 35.7 g/dL    RDW 12.1 (L) 12.3 - 15.4 %    RDW-SD 40.6 37.0 - 54.0 fl    MPV 9.1 6.0 - 12.0 fL    Platelets 270 140 - 450 10*3/mm3    Neutrophil % 76.5 (H) 42.7 - 76.0 %    Lymphocyte % 16.3 (L) 19.6 - 45.3 %    Monocyte % 5.5 5.0 - 12.0 %    Eosinophil % 0.9 0.3 - 6.2 %    Basophil % 0.2 0.0 - 1.5 %    Immature Grans % 0.6 (H) 0.0 - 0.5 %    Neutrophils, Absolute 11.81 (H) 1.70 - 7.00 10*3/mm3    Lymphocytes, Absolute 2.52 0.70 - 3.10 10*3/mm3    Monocytes, Absolute 0.85 0.10 - 0.90 10*3/mm3    Eosinophils, Absolute 0.14 0.00 - 0.40 10*3/mm3    Basophils, Absolute 0.03 0.00 - 0.20 10*3/mm3    Immature Grans, Absolute 0.10  (H) 0.00 - 0.05 10*3/mm3    nRBC 0.0 0.0 - 0.2 /100 WBC     Medications   cyclobenzaprine (FLEXERIL) tablet 10 mg (10 mg Oral Given 12/13/21 1703)     US Renal Limited    Result Date: 12/13/2021  Narrative: PROCEDURE: US RENAL LIMITED  COMPARISON: None  INDICATIONS: rt flank pain, r/o kidneystone/hydronephrosis  FINDINGS:  Images of the right kidney demonstrate normal echogenicity.  The kidney measures 9.8 cm x 5.0 cm x 4.2 cm.  There is mild prominence of the renal pelvis, favored to remain within normal limits.  No renal stone or mass is demonstrated on the provided images.    CONCLUSION:  1. Normal sonographic appearance of the right kidney.  Consider abdominal radiograph if there is persistent clinical suspicion of a ureteral stone      Toño Dempsey M.D.       Electronically Signed and Approved By: Toño Dempsey M.D. on 12/13/2021 at 17:50               Procedures/EKGs:  Procedures      Medical Decision Making:                     MDM  Number of Diagnoses or Management Options  Diagnosis management comments: Patient reports symptoms improved significantly after Flexeril.  Renal ultrasound was without findings.  The patient symptoms are likely due to muscle spasm.  I have spoken with the patient and explained the patient´s condition, diagnoses and treatment plan based on the information available to me at this time. I have answered the patient's questions and addressed any concerns. The patient has a good  understanding of the diagnosis, condition, and treatment plan as can be expected at this point. The vital signs have been stable. The patient´s condition is stable and appropriate for discharge from the emergency department.      The patient will pursue further outpatient evaluation with the primary care physician or other designated or consulting physician as outlined in the discharge instructions. They are agreeable to this plan of care and follow-up instructions have been explained in detail. The patient  has received these instructions in written format and has expressed an understanding of the discharge instructions. The patient is aware that any significant change in condition or worsening of symptoms should prompt an immediate return to this or the closest emergency department or call to 911.       Amount and/or Complexity of Data Reviewed  Clinical lab tests: reviewed and ordered  Tests in the radiology section of CPT®: reviewed and ordered  Decide to obtain previous medical records or to obtain history from someone other than the patient: yes    Risk of Complications, Morbidity, and/or Mortality  Presenting problems: low  Diagnostic procedures: low  Management options: low    Patient Progress  Patient progress: stable       Final diagnoses:   Back muscle spasm        Disposition:  ED Disposition     ED Disposition Condition Comment    Discharge Stable Patient alert and orientated x 3, no complaints of pain noted at this time, no questions noted at this time with discharge instructions            Tamara Mane, MARLEEN  12/14/21 0109

## 2021-12-13 NOTE — DISCHARGE INSTRUCTIONS
Apply warm moist compresses alternating with cool compresses several times per day.  Gentle massage or a foam roller along with gentle stretches may also help with relief of symptoms.  You can continue take Tylenol according to instructions.    Return for worsening symptoms or any new concerns.

## 2021-12-16 ENCOUNTER — HOSPITAL ENCOUNTER (OUTPATIENT)
Facility: HOSPITAL | Age: 21
Discharge: HOME | End: 2021-12-16
Attending: OBSTETRICS & GYNECOLOGY | Admitting: OBSTETRICS & GYNECOLOGY

## 2021-12-16 ENCOUNTER — HOSPITAL ENCOUNTER (OUTPATIENT)
Facility: HOSPITAL | Age: 21
End: 2021-12-16
Attending: OBSTETRICS & GYNECOLOGY | Admitting: OBSTETRICS & GYNECOLOGY

## 2021-12-16 VITALS
SYSTOLIC BLOOD PRESSURE: 130 MMHG | OXYGEN SATURATION: 98 % | HEART RATE: 81 BPM | DIASTOLIC BLOOD PRESSURE: 58 MMHG | RESPIRATION RATE: 18 BRPM | TEMPERATURE: 98 F

## 2021-12-16 LAB — A1 MICROGLOB PLACENTAL VAG QL: NEGATIVE

## 2021-12-16 PROCEDURE — 59025 FETAL NON-STRESS TEST: CPT

## 2021-12-16 PROCEDURE — G0463 HOSPITAL OUTPT CLINIC VISIT: HCPCS

## 2021-12-16 PROCEDURE — 84112 EVAL AMNIOTIC FLUID PROTEIN: CPT | Performed by: OBSTETRICS & GYNECOLOGY

## 2021-12-17 NOTE — NON STRESS TEST
[facility]   OB NST Note    2021   Name:  Drea Lindo  MRN: [id number]    Subjective:  21 y.o.  at 27w4d who presents with possible leaking fluid.  Denies any gushes or large amounts of fluid.  She denies any contractions, vaginal bleeding or decreased fetal movement.  She receives her prenatal care in [place].  She has no other complaints plaints or concerns.    Indication: Perceived SROM    NST:   Baseline: 140  Variability:   Moderate/Normal (amplitude 6-25 bpm)  Accelerations: Present (<32 weeks) 10 x 10 bpm   Decelerations: Absent   Contractions:  Absent    NST interpretation: reactive    Documented Vitals    21 2312 21 2315 21 2320 21 2330   BP: 146/71 140/66 131/61 130/58   Pulse: 87 85 93 81   Resp: 18      Temp: 98 °F (36.7 °C)      SpO2: 99% 99% 98% 98%         Lab Results (last 24 hours)     Procedure Component Value Units Date/Time    Rapid Assay For ROM - Amniotic Fluid, Amniotic Sac [[id number]]  (Normal) Collected: 21    Specimen: Amniotic Fluid from Amniotic Sac Updated: 21     Rupture of Membranes Negative    Narrative:      This test detects tiny amounts of amniotic fluid and is  approved for use at any gestational age. When there is   significant presence of blood on the swab, the test can   malfunction and is not recommended (possible false positive  results). In cases of only trace amounts of blood on the swab,  the test still functions properly and will not interfere with  the test results. In very rare cases when a sample is taken  12 hours or later after a rupture, a false negative result may  occur due to obstruction of the rupture by fetus or resealing  of the amniotic sac.           Cervical Exam:  Closed, 0%, -5 with no obvious amniotic fluid leaking per nursing exam    Assessment:  21 y.o.  AT 27w4d  Perceived SROM    Plan:   OB Precautions, HTN Precautions, FKC   The patient was discharged home with a reassuring NST,  negative AmniSure and a closed cervix with no contractions noted.  Her initial blood pressure was elevated however repeat were normal.  I have recommended she follow-up with her provider as soon as possible      Electronically signed by Herbert Unger MD, 12/17/21, 6:46 AM EST.

## 2021-12-17 NOTE — NURSING NOTE
Dr. Unger notified of arrival to triage as roster pt with c/o possibly leaking fluid. States her panties felt damp/moist several times today. No gush of fluid. No c/o pain. No ctx palpated or picked up on monitor. Amnisure collected. Reassuring FHR appropriate to gestation. SVE closed/thick. , VS WNL. Order to discharge home with reassuring tracing and negative amnisure.

## 2021-12-17 NOTE — NURSING NOTE
Amnisure negative, reassuring FHR tracing appropriate to gestational age. Discharge teaching and education provided. Written materials given to patient. Ambulated off unit with significant other.